# Patient Record
Sex: MALE | Race: WHITE | URBAN - METROPOLITAN AREA
[De-identification: names, ages, dates, MRNs, and addresses within clinical notes are randomized per-mention and may not be internally consistent; named-entity substitution may affect disease eponyms.]

---

## 2022-05-02 ENCOUNTER — OFFICE VISIT (OUTPATIENT)
Dept: URBAN - METROPOLITAN AREA CLINIC 28 | Facility: CLINIC | Age: 57
End: 2022-05-02
Payer: COMMERCIAL

## 2022-05-02 DIAGNOSIS — H52.4 PRESBYOPIA: ICD-10-CM

## 2022-05-02 DIAGNOSIS — E11.3293 TYPE 2 DIAB W MILD NONPRLF DIABETIC RTNOP W/O MACULAR EDEMA, BILATERAL: Primary | ICD-10-CM

## 2022-05-02 PROCEDURE — 92004 COMPRE OPH EXAM NEW PT 1/>: CPT | Performed by: OPTOMETRIST

## 2022-05-02 ASSESSMENT — INTRAOCULAR PRESSURE
OS: 17
OD: 17

## 2022-05-02 NOTE — IMPRESSION/PLAN
Impression: Type 2 diab w mild nonprlf diabetic rtnop w/o macular edema, bilateral: U54.0361. Plan: Educated on importance of blood glucose control as related to ocular health. 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 Monitor with repeat dilation.

## 2022-05-05 ENCOUNTER — OFFICE VISIT (OUTPATIENT)
Dept: URBAN - METROPOLITAN AREA CLINIC 28 | Facility: CLINIC | Age: 57
End: 2022-05-05
Payer: COMMERCIAL

## 2022-05-05 DIAGNOSIS — H52.13 MYOPIA, BILATERAL: Primary | ICD-10-CM

## 2022-05-05 PROCEDURE — 92012 INTRM OPH EXAM EST PATIENT: CPT | Performed by: OPTOMETRIST

## 2022-05-05 ASSESSMENT — VISUAL ACUITY
OD: 20/20
OS: 20/20

## 2022-05-05 ASSESSMENT — KERATOMETRY
OD: 43.63
OS: 43.50

## 2022-05-05 ASSESSMENT — INTRAOCULAR PRESSURE
OS: 18
OD: 18

## 2023-05-11 ENCOUNTER — OFFICE VISIT (OUTPATIENT)
Dept: URBAN - METROPOLITAN AREA CLINIC 15 | Facility: CLINIC | Age: 58
End: 2023-05-11
Payer: COMMERCIAL

## 2023-05-11 DIAGNOSIS — H33.102 UNSPECIFIED RETINOSCHISIS, LEFT EYE: ICD-10-CM

## 2023-05-11 DIAGNOSIS — Z79.4 LONG TERM (CURRENT) USE OF INSULIN: ICD-10-CM

## 2023-05-11 DIAGNOSIS — E11.3391 TYPE 2 DIAB W MODERATE NONPRLF DIAB RTNOP W/O MACULAR EDEMA, RIGHT EYE: Primary | ICD-10-CM

## 2023-05-11 DIAGNOSIS — E11.3292 TYPE 2 DIAB W MILD NONPRLF DIABETIC RTNOP W/O MACULAR EDEMA, LEFT EYE: ICD-10-CM

## 2023-05-11 DIAGNOSIS — Z79.84 LONG TERM (CURRENT) USE OF ORAL HYPOGLYCEMIC DRUGS: ICD-10-CM

## 2023-05-11 DIAGNOSIS — H25.13 AGE-RELATED NUCLEAR CATARACT, BILATERAL: ICD-10-CM

## 2023-05-11 PROCEDURE — 92134 CPTRZ OPH DX IMG PST SGM RTA: CPT | Performed by: OPTOMETRIST

## 2023-05-11 PROCEDURE — 92004 COMPRE OPH EXAM NEW PT 1/>: CPT | Performed by: OPTOMETRIST

## 2023-05-11 ASSESSMENT — INTRAOCULAR PRESSURE
OD: 17
OS: 17

## 2023-05-11 NOTE — IMPRESSION/PLAN
Impression: Unspecified retinoschisis, left eye: H33.102. Plan: Educated patient on exam findings and discussed natural history. Discussed that no treatment is likely indicated but monitoring is indicated.

## 2023-05-11 NOTE — IMPRESSION/PLAN
Impression: Type 2 diab w moderate nonprlf diab rtnop w/o macular edema, right eye: V43.5732. Plan: Educated patient on exam findings and importance of good blood glucose control and regular monitoring with PCP. Discussed importance of physical activity. Monitor q6mos with DFE. Patient had previous tx of Eyelia.

## 2023-05-11 NOTE — IMPRESSION/PLAN
Impression: Type 2 diab w mild nonprlf diabetic rtnop w/o macular edema, left eye: E11.3292.  Plan: See plan 1